# Patient Record
Sex: FEMALE | Race: WHITE | NOT HISPANIC OR LATINO | Employment: FULL TIME | URBAN - METROPOLITAN AREA
[De-identification: names, ages, dates, MRNs, and addresses within clinical notes are randomized per-mention and may not be internally consistent; named-entity substitution may affect disease eponyms.]

---

## 2018-10-09 ENCOUNTER — OFFICE VISIT (OUTPATIENT)
Dept: OBGYN CLINIC | Facility: CLINIC | Age: 33
End: 2018-10-09
Payer: COMMERCIAL

## 2018-10-09 VITALS
HEIGHT: 63 IN | SYSTOLIC BLOOD PRESSURE: 120 MMHG | DIASTOLIC BLOOD PRESSURE: 72 MMHG | BODY MASS INDEX: 24.8 KG/M2 | WEIGHT: 140 LBS

## 2018-10-09 DIAGNOSIS — Z12.4 SCREENING FOR MALIGNANT NEOPLASM OF CERVIX: ICD-10-CM

## 2018-10-09 DIAGNOSIS — Z01.419 WELL FEMALE EXAM WITH ROUTINE GYNECOLOGICAL EXAM: Primary | ICD-10-CM

## 2018-10-09 DIAGNOSIS — Z30.41 ENCOUNTER FOR SURVEILLANCE OF CONTRACEPTIVE PILLS: ICD-10-CM

## 2018-10-09 PROCEDURE — 87624 HPV HI-RISK TYP POOLED RSLT: CPT | Performed by: OBSTETRICS & GYNECOLOGY

## 2018-10-09 PROCEDURE — G0145 SCR C/V CYTO,THINLAYER,RESCR: HCPCS | Performed by: OBSTETRICS & GYNECOLOGY

## 2018-10-09 PROCEDURE — S0610 ANNUAL GYNECOLOGICAL EXAMINA: HCPCS | Performed by: OBSTETRICS & GYNECOLOGY

## 2018-10-10 NOTE — PROGRESS NOTES
ASSESSMENT & PLAN: Cathleen Cruz is a 29 y o  Shameka Ashford with normal gynecologic exam     1   Routine well woman exam done today  2    Pap and HPV:Pap with HPV was done today  Current ASCCP Guidelines reviewed  3   The patient declined STD testing  Safe sex practices have been discussed  4  The patient is sexually active  She uses LoLoEstrin for contraception and options have been discussed  Refill sent  5  The following were reviewed in today's visit: breast self exam, use and side effects of OCPs, family planning choices, adequate intake of calcium and vitamin D, exercise and healthy diet  6  Patient to return to office in 12 months for annual      All questions have been answered to her satisfaction  CC:  Annual Gynecologic Examination    HPI: Cathleen Cruz is a 29 y o  Shameka Ashford who presents for annual gynecologic examination  She has the following concerns:  Birth control refill    Health Maintenance:    She exercises 4 days per week  She wears her seatbelt routinely  She does perform regular monthly self breast exams  She feels safe at home  Patients does follow a healthy diet  History reviewed  No pertinent past medical history  Past Surgical History:   Procedure Laterality Date    APPENDECTOMY      CHOLECYSTECTOMY      WISDOM TOOTH EXTRACTION         Past OB/Gyn History:  Period Cycle (Days):  (n/a irregular due to lo loestrin )  Period Pattern: (!) Irregular  Menstrual Flow:  (spotting )  Menstrual Control: Panty liner  Dysmenorrhea: NoneNo LMP recorded (approximate)  Patient is not currently having periods (Reason: Birth Control)  Menstrual History:  OB History      Para Term  AB Living    0 0 0 0 0 0    SAB TAB Ectopic Multiple Live Births    0 0 0 0 0        History of sexually transmitted infection No  Patient is currently sexually active  heterosexual Birth control: combination OCPs        Family History   Problem Relation Age of Onset    No Known Problems Mother     No Known Problems Father     No Known Problems Brother     Diabetes Maternal Grandmother     No Known Problems Maternal Grandfather     No Known Problems Paternal Grandmother     No Known Problems Paternal Grandfather        Social History:  Social History     Social History    Marital status: /Civil Union     Spouse name: N/A    Number of children: N/A    Years of education: N/A     Occupational History    Not on file  Social History Main Topics    Smoking status: Never Smoker    Smokeless tobacco: Never Used    Alcohol use No    Drug use: No    Sexual activity: Yes     Partners: Male     Birth control/ protection: OCP     Other Topics Concern    Not on file     Social History Narrative    No narrative on file     Presently lives with her   Patient is   Patient is currently employed - anesthesia at [email or web address]   Allergen Reactions    Penicillins        Current Outpatient Prescriptions:     BIOTIN 5000 PO, Take by mouth, Disp: , Rfl:     Cyanocobalamin (VITAMIN B12) 1000 MCG TBCR, Take by mouth, Disp: , Rfl:     Multiple Vitamin (MULTI-VITAMIN DAILY PO), Take by mouth, Disp: , Rfl:     Norethin-Eth Estrad-Fe Biphas (LO LOESTRIN FE) 1 MG-10 MCG / 10 MCG TABS, Take by mouth, Disp: , Rfl:     Nutritional Supplements (VITAMIN D BOOSTER PO), Take by mouth, Disp: , Rfl:     Norethin-Eth Estrad-Fe Biphas 1 MG-10 MCG / 10 MCG TABS, Take 1 tablet by mouth daily, Disp: 84 tablet, Rfl: 3    Review of Systems:  Review of Systems   Constitutional: Negative  HENT: Negative  Respiratory: Negative  Cardiovascular: Negative  Gastrointestinal: Negative  Genitourinary: Negative  Neurological: Negative  Psychiatric/Behavioral: Negative  Physical Exam:  /72   Ht 5' 3" (1 6 m)   Wt 63 5 kg (140 lb)   LMP  (Approximate) Comment: n/a with BC pills   Breastfeeding?  No   BMI 24 80 kg/m²    Physical Exam   Constitutional: She is oriented to person, place, and time  She appears well-developed and well-nourished  Genitourinary: Vagina normal and uterus normal  There is no tenderness or lesion on the right labia  There is no tenderness or lesion on the left labia  Vagina exhibits rugosity  No tenderness or bleeding in the vagina  No vaginal discharge found  Right adnexum does not display mass and does not display tenderness  Left adnexum does not display mass and does not display tenderness  Cervix is nulliparous  Cervix does not exhibit motion tenderness or discharge  Uterus is mobile and midaxial  Uterus is not enlarged or tender  HENT:   Head: Normocephalic and atraumatic  Cardiovascular: Normal rate, regular rhythm and normal heart sounds  Pulmonary/Chest: Effort normal and breath sounds normal  No respiratory distress  She has no wheezes  Abdominal: Soft  She exhibits no distension  There is no tenderness  Neurological: She is alert and oriented to person, place, and time  Skin: Skin is warm and dry  Nursing note and vitals reviewed

## 2018-10-12 LAB
HPV HR 12 DNA CVX QL NAA+PROBE: NEGATIVE
HPV16 DNA CVX QL NAA+PROBE: NEGATIVE
HPV18 DNA CVX QL NAA+PROBE: NEGATIVE

## 2018-10-15 LAB
LAB AP GYN PRIMARY INTERPRETATION: NORMAL
Lab: NORMAL

## 2019-02-19 ENCOUNTER — TRANSCRIBE ORDERS (OUTPATIENT)
Dept: ADMINISTRATIVE | Facility: HOSPITAL | Age: 34
End: 2019-02-19

## 2019-02-19 ENCOUNTER — APPOINTMENT (OUTPATIENT)
Dept: LAB | Facility: HOSPITAL | Age: 34
End: 2019-02-19
Attending: ANESTHESIOLOGY
Payer: COMMERCIAL

## 2019-02-19 DIAGNOSIS — Z11.1 SCREENING EXAMINATION FOR PULMONARY TUBERCULOSIS: Primary | ICD-10-CM

## 2019-02-19 DIAGNOSIS — Z11.1 SCREENING EXAMINATION FOR PULMONARY TUBERCULOSIS: ICD-10-CM

## 2019-02-19 PROCEDURE — 86480 TB TEST CELL IMMUN MEASURE: CPT

## 2019-02-19 PROCEDURE — 36415 COLL VENOUS BLD VENIPUNCTURE: CPT

## 2019-02-21 LAB
GAMMA INTERFERON BACKGROUND BLD IA-ACNC: 0.06 IU/ML
M TB IFN-G BLD-IMP: NEGATIVE
M TB IFN-G CD4+ BCKGRND COR BLD-ACNC: -0.01 IU/ML
M TB IFN-G CD4+ BCKGRND COR BLD-ACNC: 0 IU/ML
MITOGEN IGNF BCKGRD COR BLD-ACNC: >10 IU/ML